# Patient Record
Sex: MALE
[De-identification: names, ages, dates, MRNs, and addresses within clinical notes are randomized per-mention and may not be internally consistent; named-entity substitution may affect disease eponyms.]

---

## 2019-06-24 ENCOUNTER — HOSPITAL ENCOUNTER (EMERGENCY)
Dept: HOSPITAL 92 - SCSER | Age: 44
Discharge: HOME | End: 2019-06-24
Payer: SELF-PAY

## 2019-06-24 DIAGNOSIS — N13.2: ICD-10-CM

## 2019-06-24 DIAGNOSIS — N13.4: ICD-10-CM

## 2019-06-24 DIAGNOSIS — N39.0: Primary | ICD-10-CM

## 2019-06-24 LAB
ANION GAP SERPL CALC-SCNC: 13 MMOL/L (ref 10–20)
BACTERIA UR QL AUTO: (no result) HPF
BUN SERPL-MCNC: 13 MG/DL (ref 8.9–20.6)
CALCIUM SERPL-MCNC: 8.9 MG/DL (ref 7.8–10.44)
CHLORIDE SERPL-SCNC: 108 MMOL/L (ref 98–107)
CO2 SERPL-SCNC: 22 MMOL/L (ref 22–29)
CREAT CL PREDICTED SERPL C-G-VRATE: 0 ML/MIN (ref 70–130)
CRYSTALS URNS QL MICRO: (no result) HPF
GLUCOSE SERPL-MCNC: 131 MG/DL (ref 70–105)
POTASSIUM SERPL-SCNC: 3.7 MMOL/L (ref 3.5–5.1)
PROT UR STRIP.AUTO-MCNC: 100 MG/DL
SODIUM SERPL-SCNC: 139 MMOL/L (ref 136–145)
SP GR UR STRIP: 1.02 (ref 1–1.03)
WBC UR QL AUTO: (no result) HPF (ref 0–3)

## 2019-06-24 PROCEDURE — 81003 URINALYSIS AUTO W/O SCOPE: CPT

## 2019-06-24 PROCEDURE — 74176 CT ABD & PELVIS W/O CONTRAST: CPT

## 2019-06-24 PROCEDURE — 81015 MICROSCOPIC EXAM OF URINE: CPT

## 2019-06-24 PROCEDURE — 36415 COLL VENOUS BLD VENIPUNCTURE: CPT

## 2019-06-24 PROCEDURE — 87591 N.GONORRHOEAE DNA AMP PROB: CPT

## 2019-06-24 PROCEDURE — 80048 BASIC METABOLIC PNL TOTAL CA: CPT

## 2019-06-24 PROCEDURE — 96372 THER/PROPH/DIAG INJ SC/IM: CPT

## 2019-06-24 PROCEDURE — 87491 CHLMYD TRACH DNA AMP PROBE: CPT

## 2019-06-24 NOTE — CT
CT Stone  Protocol: 6/24/2019 4:04 PM



HISTORY: Left flank pain since Saturday



COMPARISON: None.



Procedure: 



Multiple contiguous axial images were obtained and a CT of the abdomen and pelvis without IV contrast
. Coronal reformats were performed.



FINDINGS:

This examination is limited for the evaluation of solid organs and vascular structures due to the lac
k of intravenous contrast.



Lower Chest: within normal limits.



Abdomen:

Liver: Diffuse fatty infiltration

Bile Ducts: Normal caliber.

Gallbladder: No calcified gallstones. Normal caliber wall.

Pancreas: within normal limits.

Spleen: within normal limits.

Adrenals: within normal limits.

Kidneys: Multiple nonobstructing calcifications in the lower pole the left kidney measuring up to 9 m
m in size. Severe left hydronephrosis and enlargement of the proximal left ureter. No right

hydronephrosis or right renal calculi.



Pelvis:

Reproductive Organs: No pelvic masses.

Ureters: Severe enlargement of the proximal left ureter. There is a 4 mm calcification in the left ur
eter where it abruptly transitions to a normal caliber ureter along the midportion. The

calcification is not immediately at the transition point where the ureter is tortuous.

Bladder: within normal limits.



Bowel: Normal caliber.

Mesenteric Lymph Nodes: No enlarged mesenteric lymph nodes.

Peritoneum: No ascites or free air, no fluid collection.

Vessels: Normal caliber aorta 

Retroperitoneum: within normal limits.

Abdominal Wall: within normal limits.

Bones: Degenerative changes in the spine.  



IMPRESSION:

1. Severe left hydronephrosis and proximal hydroureter. The ureter transitions to normal caliber carloz
g its midportion where a 4 mm calcification is present. This calcification is not immediately at

the transition point and may or may not be a cause for the ureteral obstruction.

2. Multiple nonobstructing calcifications in a lower pole calyx of the left kidney.

3. Fatty liver



Reported By: Moe Yee 

Electronically Signed:  6/24/2019 4:33 PM